# Patient Record
Sex: MALE | Race: BLACK OR AFRICAN AMERICAN | NOT HISPANIC OR LATINO | Employment: OTHER | URBAN - METROPOLITAN AREA
[De-identification: names, ages, dates, MRNs, and addresses within clinical notes are randomized per-mention and may not be internally consistent; named-entity substitution may affect disease eponyms.]

---

## 2017-01-24 RX ORDER — CLOPIDOGREL BISULFATE 75 MG/1
TABLET ORAL
Qty: 90 TABLET | OUTPATIENT
Start: 2017-01-24

## 2017-01-24 RX ORDER — ATENOLOL 50 MG/1
TABLET ORAL
Qty: 90 TABLET | OUTPATIENT
Start: 2017-01-24

## 2017-01-24 RX ORDER — LOSARTAN POTASSIUM 100 MG/1
TABLET ORAL
Qty: 90 TABLET | OUTPATIENT
Start: 2017-01-24

## 2017-01-24 RX ORDER — AMLODIPINE BESYLATE 10 MG/1
TABLET ORAL
Qty: 90 TABLET | OUTPATIENT
Start: 2017-01-24

## 2017-02-08 RX ORDER — ROSUVASTATIN CALCIUM 40 MG/1
TABLET, COATED ORAL
Qty: 30 TABLET | Refills: 0 | Status: SHIPPED | OUTPATIENT
Start: 2017-02-08 | End: 2017-03-08 | Stop reason: SDUPTHER

## 2017-02-08 RX ORDER — ATENOLOL 50 MG/1
TABLET ORAL
Qty: 30 TABLET | Refills: 0 | Status: SHIPPED | OUTPATIENT
Start: 2017-02-08 | End: 2017-03-08 | Stop reason: SDUPTHER

## 2017-02-08 RX ORDER — AMLODIPINE BESYLATE 10 MG/1
TABLET ORAL
Qty: 30 TABLET | Refills: 0 | Status: SHIPPED | OUTPATIENT
Start: 2017-02-08 | End: 2017-03-15 | Stop reason: SDUPTHER

## 2017-02-08 RX ORDER — LOSARTAN POTASSIUM 100 MG/1
TABLET ORAL
Qty: 30 TABLET | Refills: 0 | Status: SHIPPED | OUTPATIENT
Start: 2017-02-08 | End: 2017-03-08 | Stop reason: SDUPTHER

## 2017-02-08 RX ORDER — CLOPIDOGREL BISULFATE 75 MG/1
TABLET ORAL
Qty: 30 TABLET | Refills: 0 | Status: SHIPPED | OUTPATIENT
Start: 2017-02-08 | End: 2017-03-08 | Stop reason: SDUPTHER

## 2017-02-08 RX ORDER — FUROSEMIDE 40 MG/1
40 TABLET ORAL DAILY
Qty: 30 TABLET | Refills: 0 | Status: SHIPPED | OUTPATIENT
Start: 2017-02-08 | End: 2017-03-08 | Stop reason: SDUPTHER

## 2017-02-14 DIAGNOSIS — M54.50 LUMBAR SPINE PAIN: Primary | ICD-10-CM

## 2017-02-17 ENCOUNTER — TELEPHONE (OUTPATIENT)
Dept: ORTHOPEDICS | Facility: CLINIC | Age: 72
End: 2017-02-17

## 2017-02-17 ENCOUNTER — TELEPHONE (OUTPATIENT)
Dept: CARDIOLOGY | Facility: CLINIC | Age: 72
End: 2017-02-17

## 2017-02-17 NOTE — TELEPHONE ENCOUNTER
----- Message from Analy Avendaño sent at 2/17/2017 10:49 AM CST -----  Contact: pt called  Pt called, states he normally has blood work drawn and is requesting it done before visit.Ph [phone]. Pt will see Dr. Zamora on 2/20/17. Former pt of Dr. Jones and LOV 11/24/15. Thank you

## 2017-02-17 NOTE — TELEPHONE ENCOUNTER
Spoke to pt regarding appt Monday 2/20/17 at 130p with Jennifer Isaac PA-C. Pt was informed to arrive on the 2nd floor at 1245p, then up to floor 5 to see Jennifer. Pt verbalized understanding.    Isabel SAUNDERS

## 2017-02-20 ENCOUNTER — OFFICE VISIT (OUTPATIENT)
Dept: CARDIOLOGY | Facility: CLINIC | Age: 72
End: 2017-02-20
Payer: MEDICARE

## 2017-02-20 ENCOUNTER — OFFICE VISIT (OUTPATIENT)
Dept: ORTHOPEDICS | Facility: CLINIC | Age: 72
End: 2017-02-20
Payer: MEDICARE

## 2017-02-20 ENCOUNTER — HOSPITAL ENCOUNTER (OUTPATIENT)
Dept: RADIOLOGY | Facility: HOSPITAL | Age: 72
Discharge: HOME | End: 2017-02-20
Attending: ORTHOPAEDIC SURGERY
Payer: MEDICARE

## 2017-02-20 VITALS
HEIGHT: 71 IN | HEART RATE: 51 BPM | BODY MASS INDEX: 25.99 KG/M2 | WEIGHT: 185.63 LBS | DIASTOLIC BLOOD PRESSURE: 88 MMHG | SYSTOLIC BLOOD PRESSURE: 170 MMHG

## 2017-02-20 VITALS
WEIGHT: 196.88 LBS | BODY MASS INDEX: 28.18 KG/M2 | DIASTOLIC BLOOD PRESSURE: 91 MMHG | HEIGHT: 70 IN | HEART RATE: 48 BPM | RESPIRATION RATE: 16 BRPM | SYSTOLIC BLOOD PRESSURE: 194 MMHG

## 2017-02-20 DIAGNOSIS — M54.50 LUMBAR SPINE PAIN: ICD-10-CM

## 2017-02-20 DIAGNOSIS — I10 ESSENTIAL HYPERTENSION: Primary | ICD-10-CM

## 2017-02-20 DIAGNOSIS — Z95.5 STATUS POST CORONARY ARTERY STENT PLACEMENT: ICD-10-CM

## 2017-02-20 DIAGNOSIS — M43.10 SPONDYLOLISTHESIS, UNSPECIFIED SPINAL REGION: Primary | ICD-10-CM

## 2017-02-20 DIAGNOSIS — E78.5 HYPERLIPIDEMIA, UNSPECIFIED HYPERLIPIDEMIA TYPE: ICD-10-CM

## 2017-02-20 DIAGNOSIS — I25.10 CORONARY ARTERY DISEASE INVOLVING NATIVE CORONARY ARTERY OF NATIVE HEART WITHOUT ANGINA PECTORIS: ICD-10-CM

## 2017-02-20 PROCEDURE — 99214 OFFICE O/P EST MOD 30 MIN: CPT | Mod: S$PBB,,, | Performed by: PHYSICIAN ASSISTANT

## 2017-02-20 PROCEDURE — 99999 PR PBB SHADOW E&M-EST. PATIENT-LVL III: CPT | Mod: PBBFAC,,, | Performed by: INTERNAL MEDICINE

## 2017-02-20 PROCEDURE — 72120 X-RAY BEND ONLY L-S SPINE: CPT | Mod: 26,,, | Performed by: RADIOLOGY

## 2017-02-20 PROCEDURE — 99999 PR PBB SHADOW E&M-EST. PATIENT-LVL IV: CPT | Mod: PBBFAC,,, | Performed by: PHYSICIAN ASSISTANT

## 2017-02-20 PROCEDURE — 99213 OFFICE O/P EST LOW 20 MIN: CPT | Mod: PBBFAC,27 | Performed by: INTERNAL MEDICINE

## 2017-02-20 PROCEDURE — 99213 OFFICE O/P EST LOW 20 MIN: CPT | Mod: S$PBB,,, | Performed by: INTERNAL MEDICINE

## 2017-02-20 PROCEDURE — 72100 X-RAY EXAM L-S SPINE 2/3 VWS: CPT | Mod: 26,,, | Performed by: RADIOLOGY

## 2017-02-20 RX ORDER — ASPIRIN 325 MG
325 TABLET ORAL DAILY
COMMUNITY

## 2017-02-20 RX ORDER — DICLOFENAC SODIUM 75 MG/1
75 TABLET, DELAYED RELEASE ORAL 2 TIMES DAILY
Qty: 60 TABLET | Refills: 1 | Status: SHIPPED | OUTPATIENT
Start: 2017-02-20 | End: 2017-03-22

## 2017-02-20 NOTE — MR AVS SNAPSHOT
[facility]  [address]  [facility] [address]  Phone: [phone]                  Efra Huitron   2017 4:30 PM   Office Visit    Description:  Male : 1945   Provider:  Harsh Zamora MD   Department:  [facility]           Reason for Visit     Hypertension           Diagnoses this Visit        Comments    Essential hypertension    -  Primary     Status post coronary artery stent placement         Coronary artery disease involving native coronary artery of native heart without angina pectoris         Hyperlipidemia, unspecified hyperlipidemia type                To Do List           Goals (5 Years of Data)     None      Follow-Up and Disposition     Return in about 1 year (around 2018).      [facility]     [facility] -  Assistance  Registered nurses in the [facility] provide clinical advisement, health education, appointment booking, and other advisory services.  Call for this free service at [phone].             Medications           Message regarding Medications     Verify the changes and/or additions to your medication regime listed below are the same as discussed with your clinician today.  If any of these changes or additions are incorrect, please notify your healthcare provider.             Verify that the below list of medications is an accurate representation of the medications you are currently taking.  If none reported, the list may be blank. If incorrect, please contact your healthcare provider. Carry this list with you in case of emergency.           Current Medications     amlodipine (NORVASC) 10 MG tablet Take 1 tablet by mouth  daily    aspirin 325 MG tablet Take 500 mg by mouth once daily.    atenolol (TENORMIN) 50 MG tablet Take 1 tablet by mouth  daily    clopidogrel (PLAVIX) 75 mg tablet Take 1 tablet by mouth  daily    coenzyme Q10 400 mg Cap Take by mouth. 1 Capsule Oral Every day     "FLAXSEED OIL MISC Take by mouth. 3 Capsule Oral Every day    furosemide (LASIX) 40 MG tablet Take 1 tablet (40 mg total) by mouth once daily. 1 Tablet Oral Every day    losartan (COZAAR) 100 MG tablet Take 1 tablet by mouth  daily    omega-3 fatty acids 1,250 mg Cap Take by mouth. 3 Capsule Oral Every day    rosuvastatin (CRESTOR) 40 MG Tab Take 1 tablet by mouth  daily    diclofenac (VOLTAREN) 75 MG EC tablet Take 1 tablet (75 mg total) by mouth 2 (two) times daily.           Clinical Reference Information           Your Vitals Were     BP Pulse Height Weight BMI    170/88 (BP Location: Left arm, Patient Position: Sitting, BP Method: Automatic) 51 5' 11" (1.803 m) 84.2 kg (185 lb 10 oz) 25.89 kg/m2      Blood Pressure          Most Recent Value    Right Arm BP - Sitting  174/81    Left Arm BP - Sitting  170/88    BP  (!)  170/88      Allergies as of 2/20/2017     No Known Allergies      Immunizations Administered on Date of Encounter - 2/20/2017     None      [facility] Sign-Up     Activating your [facility] account is as easy as 1-2-3!     1) Visit [email or web address], select Sign Up Now, enter this activation code and your date of birth, then select Next.  [email or web address]  Expires: 4/6/2017  3:36 PM      2) Create a username and password to use when you visit [facility] in the future and select a security question in case you lose your password and select Next.    3) Enter your e-mail address and click Sign Up!    Additional Information  If you have questions, please e-mail [email or web address] or call [phone] to talk to our [facility] staff. Remember, [facility] is NOT to be used for urgent needs. For medical emergencies, dial 911.         Language Assistance Services     ATTENTION: Language assistance services are available, free of charge. Please call [phone].      ATENCIÓN: Si habla español, tiene a herzog disposición servicios gratuitos de asistencia lingüística. Llame al [phone].     CHÚ Ý: N?u b?n " nói Ti?ng Vi?t, có các d?ch v? h? tr? ngôn ng? mi?n phí dành cho b?n. G?i s? [phone].         [facility] complies with applicable Federal civil rights laws and does not discriminate on the basis of race, color, national origin, age, disability, or sex.

## 2017-02-20 NOTE — PROGRESS NOTES
Chart has been dictated using voice recognition software.  It is not been reviewed carefully for any transcriptional errors due to this technology.   Subjective:   Patient ID:  Efra Huitron is a 71 y.o. male who presents for evaluation of Hypertension (Annual Exam)      HPI: Patient from [place] with hypertension, hyperlipidemia, and coronary artery disease with PCI (with stent) in 2001. Patient denies any chest discomfort on exertion or at rest.  Patient denies any dyspnea at rest or on exertion, orthopnea, PND, or edema. However, patient wears compression stockings.  Patient denies any palpitations, lightheadedness, or syncope. Patient denies lower extremity claudication.       Cardiac risk factors: hyperlipidemia, hypertension, sedentary lifestyle, a history of coronary artery disease       Past Medical History   Diagnosis Date    Hypertension     S/P PTCA (percutaneous transluminal coronary angioplasty) 2001       Past Surgical History   Procedure Laterality Date    Coronary angioplasty with stent placement  2001     LAD and CFX       Social History   Substance Use Topics    Smoking status: Never Smoker    Smokeless tobacco: None    Alcohol use No         Current Outpatient Prescriptions:     amlodipine (NORVASC) 10 MG tablet, Take 1 tablet by mouth  daily, Disp: 30 tablet, Rfl: 0    aspirin 325 MG tablet, Take 500 mg by mouth once daily., Disp: , Rfl:     atenolol (TENORMIN) 50 MG tablet, Take 1 tablet by mouth  daily, Disp: 30 tablet, Rfl: 0    clopidogrel (PLAVIX) 75 mg tablet, Take 1 tablet by mouth  daily, Disp: 30 tablet, Rfl: 0    coenzyme Q10 400 mg Cap, Take by mouth. 1 Capsule Oral Every day, Disp: , Rfl:     FLAXSEED OIL MISC, Take by mouth. 3 Capsule Oral Every day, Disp: , Rfl:     furosemide (LASIX) 40 MG tablet, Take 1 tablet (40 mg total) by mouth once daily. 1 Tablet Oral Every day, Disp: 30 tablet, Rfl: 0    losartan (COZAAR) 100 MG tablet, Take 1 tablet by mouth  daily,  "Disp: 30 tablet, Rfl: 0    omega-3 fatty acids 1,250 mg Cap, Take by mouth. 3 Capsule Oral Every day, Disp: , Rfl:     rosuvastatin (CRESTOR) 40 MG Tab, Take 1 tablet by mouth  daily, Disp: 30 tablet, Rfl: 0    diclofenac (VOLTAREN) 75 MG EC tablet, Take 1 tablet (75 mg total) by mouth 2 (two) times daily., Disp: 60 tablet, Rfl: 1    Review of patient's allergies indicates:  No Known Allergies    ROS - The patient's review of systems is negative for any significant constitutional, respiratory, endocrine, hematologic, musculoskeletal or neurologic symptoms.   Objective:   Physical Exam   Constitutional: He is oriented to person, place, and time. He appears well-developed and well-nourished.   BP (!) 170/88 (BP Location: Left arm, Patient Position: Sitting, BP Method: Automatic)  Pulse (!) 51  Ht 5' 11" (1.803 m)  Wt 84.2 kg (185 lb 10 oz)  BMI 25.89 kg/m2  Repeat BP  150/90, left arm sitting   Neck: Neck supple. No JVD present. Carotid bruit is not present.   Cardiovascular: Normal rate, regular rhythm, normal heart sounds and intact distal pulses.  Exam reveals no gallop and no friction rub.    No murmur heard.  Pulmonary/Chest: Effort normal and breath sounds normal. He has no wheezes. He has no rales.   Abdominal: Soft. Bowel sounds are normal. He exhibits no abdominal bruit. There is no hepatomegaly. There is no tenderness.   Musculoskeletal: He exhibits no edema.   Neurological: He is alert and oriented to person, place, and time. He has normal strength.   Skin: No cyanosis. Nails show no clubbing.       No results found for: WBC, HGB, HCT, MCV, PLT    Lab Results   Component Value Date     11/24/2015    K 4.5 11/24/2015    BUN 17 11/24/2015    CREATININE 1.7 (H) 11/24/2015    GLU 88 11/24/2015    CHOL 161 11/24/2015    HDL 49 11/24/2015    LDLCALC 94.4 11/24/2015    TRIG 88 11/24/2015    CHOLHDL 30.4 11/24/2015       Assessment:     1. Essential hypertension    2. Status post coronary artery stent " placement    3. Coronary artery disease involving native coronary artery of native heart without angina pectoris    4. Hyperlipidemia, unspecified hyperlipidemia type      Patient appears to be doing very well without evidence of ischemia, heart failure, or significant arrhythmias.  He was discussed with the patient for him to seek medical care closer to where he lives in [place].  The patient's blood pressure most likely needs further control, however, he is leaving for [place] in 2 days.  Therefore, no change in his medical therapy will be made at this time.  The patient was instructed on use of [facility] and he will obtain home blood pressures and send them and via [facility].  Depending on the result, changes in her medication will be made at that time the patient will be followed locally.  Additionally, the patient agreed to enter the ADAPTABLE study which is testing 81 mg versus 325 mg of aspirin in patients with coronary artery disease.     Plan:     Efra was seen today for hypertension.    Diagnoses and all orders for this visit:    Essential hypertension    Status post coronary artery stent placement    Coronary artery disease involving native coronary artery of native heart without angina pectoris    Hyperlipidemia, unspecified hyperlipidemia type    Other orders  -     aspirin 325 MG tablet; Take 500 mg by mouth once daily.

## 2017-02-20 NOTE — PROGRESS NOTES
DATE: 2/20/2017  PATIENT: Efra Huitron    Supervising Physician: Dieudonne Caba M.D.    CHIEF COMPLAINT: back pain    HISTORY:  Efra Huitron is a 71 y.o. male here for initial evaluation of low back pain (Back - 4, Leg - 0). The pain has been present for many years. The patient describes the pain as aching.  The pain is worse with lifting and improved by activity. There is no associated numbness and tingling. There is no subjective weakness. Prior treatments have included mobic, home exercises and shoe inserts, but no physical therapy, ESIs or surgery.    The patient denies myelopathic symptoms such as handwriting changes or difficulty with buttons/coins/keys. Denies perineal paresthesias, bowel/bladder dysfunction.    PAST MEDICAL/SURGICAL HISTORY:  Past Medical History   Diagnosis Date    Hypertension     S/P PTCA (percutaneous transluminal coronary angioplasty) 2001     Past Surgical History   Procedure Laterality Date    Coronary angioplasty with stent placement  2001     LAD and CFX       Medications:   Current Outpatient Prescriptions on File Prior to Visit   Medication Sig Dispense Refill    amlodipine (NORVASC) 10 MG tablet Take 1 tablet by mouth  daily 30 tablet 0    atenolol (TENORMIN) 50 MG tablet Take 1 tablet by mouth  daily 30 tablet 0    clopidogrel (PLAVIX) 75 mg tablet Take 1 tablet by mouth  daily 30 tablet 0    coenzyme Q10 400 mg Cap Take by mouth. 1 Capsule Oral Every day      FLAXSEED OIL MISC Take by mouth. 3 Capsule Oral Every day      furosemide (LASIX) 40 MG tablet Take 1 tablet (40 mg total) by mouth once daily. 1 Tablet Oral Every day 30 tablet 0    losartan (COZAAR) 100 MG tablet Take 1 tablet by mouth  daily 30 tablet 0    omega-3 fatty acids 1,250 mg Cap Take by mouth. 3 Capsule Oral Every day      rosuvastatin (CRESTOR) 40 MG Tab Take 1 tablet by mouth  daily 30 tablet 0    [DISCONTINUED] meloxicam (MOBIC) 15 MG tablet Take 1 tablet (15 mg total) by mouth  "once daily. Take with food. 90 tablet 2     No current facility-administered medications on file prior to visit.        Social History:   Social History     Social History    Marital status: Single     Spouse name: N/A    Number of children: N/A    Years of education: N/A     Occupational History    Not on file.     Social History Main Topics    Smoking status: Never Smoker    Smokeless tobacco: Not on file    Alcohol use No    Drug use: Not on file    Sexual activity: Not on file     Other Topics Concern    Not on file     Social History Narrative       REVIEW OF SYSTEMS:  Constitution: Negative. Negative for chills, fever and night sweats.   Cardiovascular: Negative for chest pain and syncope.   Respiratory: Negative for cough and shortness of breath.   Gastrointestinal: See HPI. Negative for nausea/vomiting. Negative for abdominal pain.  Genitourinary: See HPI. Negative for discoloration or dysuria.  Skin: Negative for dry skin, itching and rash.   Hematologic/Lymphatic: Negative for bleeding problem. Does not bruise/bleed easily.   Musculoskeletal: Negative for falls and muscle weakness.   Neurological: See HPI. No seizures.   Endocrine: Negative for polydipsia, polyphagia and polyuria.   Allergic/Immunologic: Negative for hives and persistent infections.     EXAM:  Visit Vitals    BP (!) 194/91    Pulse (!) 48    Resp 16    Ht 5' 10" (1.778 m)    Wt 89.3 kg (196 lb 13.9 oz)    BMI 28.25 kg/m2       General: The patient is a very pleasant 71 y.o. male in no apparent distress, the patient is oriented to person, place and time.  Psych: Normal mood and affect  HEENT: Vision grossly intact, hearing intact to the spoken word.  Lungs: Respirations unlabored.  Gait: Normal station and gait, no difficulty with toe or heel walk.   Skin: Dorsal lumbar skin negative for rashes, lesions, hairy patches and surgical scars. There is mild lumbar tenderness to palpation.  Range of motion: Lumbar range of motion " is acceptable.  Spinal Balance: Global saggital and coronal spinal balance acceptable, not significant for scoliosis and kyphosis.  Musculoskeletal: No pain with the range of motion of the bilateral hips. No trochanteric tenderness to palpation.  Vascular: Bilateral lower extremities warm and well perfused, dorsalis pedis pulses 2+ bilaterally.  Neurological: Normal strength and tone in all major motor groups in the bilateral lower extremities. Normal sensation to light touch in the L2-S1 dermatomes bilaterally.  Deep tendon reflexes symmetric 1+ in the bilateral lower extremities.  Negative Babinski bilaterally. Straight leg raise negative bilaterally.    IMAGING:      Today I personally reviewed AP, Lat and Flex/Ex  upright L-spine films that demonstrate grade I anterolisthesis of L4/5 with probably spondylolysis at L4.      ASSESSMENT/PLAN:    Efra was seen today for pain.    Diagnoses and all orders for this visit:    Spondylolisthesis, unspecified spinal region  -     Ambulatory Referral to Physical/Occupational Therapy    Other orders  -     diclofenac (VOLTAREN) 75 MG EC tablet; Take 1 tablet (75 mg total) by mouth 2 (two) times daily.    The patient is having back pain without radicular symptoms.  He had radicular symptoms in the past which have resolved.  He says his symptoms were worse after lifting a heavy piece of glass up the stairs and have significantly improved since he bought show inserts.  Referral for PT placed today.  He lives in [place] and will go to PT there.  Follow up after therapy if symptoms persist.       Return in about 6 weeks (around 4/3/2017), or if symptoms worsen or fail to improve.

## 2017-03-08 RX ORDER — LOSARTAN POTASSIUM 100 MG/1
TABLET ORAL
Qty: 90 TABLET | Refills: 3 | Status: SHIPPED | OUTPATIENT
Start: 2017-03-08 | End: 2018-02-17 | Stop reason: SDUPTHER

## 2017-03-08 RX ORDER — FUROSEMIDE 40 MG/1
40 TABLET ORAL DAILY
Qty: 90 TABLET | Refills: 3 | Status: SHIPPED | OUTPATIENT
Start: 2017-03-08 | End: 2018-02-17 | Stop reason: SDUPTHER

## 2017-03-08 RX ORDER — ATENOLOL 50 MG/1
TABLET ORAL
Qty: 90 TABLET | Refills: 3 | Status: SHIPPED | OUTPATIENT
Start: 2017-03-08 | End: 2018-02-17 | Stop reason: SDUPTHER

## 2017-03-08 RX ORDER — DICLOFENAC SODIUM 75 MG/1
TABLET, DELAYED RELEASE ORAL
Qty: 120 TABLET | OUTPATIENT
Start: 2017-03-08

## 2017-03-08 RX ORDER — CLOPIDOGREL BISULFATE 75 MG/1
TABLET ORAL
Qty: 90 TABLET | Refills: 3 | Status: SHIPPED | OUTPATIENT
Start: 2017-03-08 | End: 2018-02-17 | Stop reason: SDUPTHER

## 2017-03-08 RX ORDER — ROSUVASTATIN CALCIUM 40 MG/1
TABLET, COATED ORAL
Qty: 90 TABLET | Refills: 3 | Status: SHIPPED | OUTPATIENT
Start: 2017-03-08 | End: 2018-02-17 | Stop reason: SDUPTHER

## 2017-03-08 NOTE — TELEPHONE ENCOUNTER
----- Message from Ale Dolan sent at 3/8/2017 12:27 PM CST -----  Contact: pt  Pt says after his visit with dr. Zamora his refills were not called in to the mail order service.   LOV 2/20/17 Dr. Zamora 90 day supply needed  Atenolol 50 mg  Crestor 40 mg  Losartan 100 mg  Lasix 40 mg  Plavix 75 mg    Thanks

## 2017-03-15 RX ORDER — AMLODIPINE BESYLATE 10 MG/1
TABLET ORAL
Qty: 90 TABLET | Refills: 3 | Status: SHIPPED | OUTPATIENT
Start: 2017-03-15 | End: 2018-02-17 | Stop reason: SDUPTHER

## 2017-03-15 NOTE — TELEPHONE ENCOUNTER
----- Message from Ale Dolan sent at 3/15/2017 11:45 AM CDT -----  Contact: pt   Pt doing a follow up on a prior authorization on his Norvasc 10 mg he uses [employer] mail service.  LOV 2/20/17 Dr. Zamora    Thanks

## 2017-10-27 NOTE — TELEPHONE ENCOUNTER
----- Message from Ale Dolan sent at 2/8/2017  2:58 PM CST -----  Contact: pt  Pt called needing a refill on all his meds  LOV 11/24/15 Dr. Jones scheduled to see Dr. Zamora on 2/20/17    Thanks   
no anemia, no easy bruising, no jaundice, no swollen lymph nodes.

## 2018-02-17 RX ORDER — LOSARTAN POTASSIUM 100 MG/1
TABLET ORAL
Qty: 90 TABLET | Refills: 3 | Status: SHIPPED | OUTPATIENT
Start: 2018-02-17 | End: 2018-08-20

## 2018-02-17 RX ORDER — AMLODIPINE BESYLATE 10 MG/1
TABLET ORAL
Qty: 90 TABLET | Refills: 3 | Status: SHIPPED | OUTPATIENT
Start: 2018-02-17 | End: 2019-01-08 | Stop reason: SDUPTHER

## 2018-02-17 RX ORDER — ATENOLOL 50 MG/1
TABLET ORAL
Qty: 90 TABLET | Refills: 3 | Status: SHIPPED | OUTPATIENT
Start: 2018-02-17 | End: 2019-01-08 | Stop reason: SDUPTHER

## 2018-02-17 RX ORDER — FUROSEMIDE 40 MG/1
TABLET ORAL
Qty: 90 TABLET | Refills: 3 | Status: SHIPPED | OUTPATIENT
Start: 2018-02-17 | End: 2019-01-08 | Stop reason: SDUPTHER

## 2018-02-17 RX ORDER — CLOPIDOGREL BISULFATE 75 MG/1
TABLET ORAL
Qty: 90 TABLET | Refills: 3 | Status: SHIPPED | OUTPATIENT
Start: 2018-02-17 | End: 2019-01-08 | Stop reason: SDUPTHER

## 2018-02-17 RX ORDER — ROSUVASTATIN CALCIUM 40 MG/1
TABLET, COATED ORAL
Qty: 90 TABLET | Refills: 3 | Status: SHIPPED | OUTPATIENT
Start: 2018-02-17 | End: 2019-01-08 | Stop reason: SDUPTHER

## 2018-03-08 ENCOUNTER — APPOINTMENT (RX ONLY)
Dept: URBAN - METROPOLITAN AREA CLINIC 45 | Facility: CLINIC | Age: 73
Setting detail: DERMATOLOGY
End: 2018-03-08

## 2018-03-08 ENCOUNTER — APPOINTMENT (RX ONLY)
Dept: URBAN - METROPOLITAN AREA CLINIC 44 | Facility: CLINIC | Age: 73
Setting detail: DERMATOLOGY
End: 2018-03-08

## 2018-03-08 DIAGNOSIS — L81.5 LEUKODERMA, NOT ELSEWHERE CLASSIFIED: ICD-10-CM

## 2018-03-08 PROCEDURE — 99202 OFFICE O/P NEW SF 15 MIN: CPT

## 2018-03-08 PROCEDURE — ? COUNSELING

## 2018-03-08 PROCEDURE — ? TREATMENT REGIMEN

## 2018-03-08 ASSESSMENT — LOCATION ZONE DERM
LOCATION ZONE: TRUNK
LOCATION ZONE: ARM
LOCATION ZONE: FACE
LOCATION ZONE: FACE
LOCATION ZONE: NECK
LOCATION ZONE: ARM
LOCATION ZONE: TRUNK
LOCATION ZONE: NECK

## 2018-03-08 ASSESSMENT — LOCATION DETAILED DESCRIPTION DERM
LOCATION DETAILED: LEFT LATERAL MALAR CHEEK
LOCATION DETAILED: RIGHT SUPERIOR LATERAL UPPER BACK
LOCATION DETAILED: LEFT LATERAL MALAR CHEEK
LOCATION DETAILED: LEFT SUPERIOR UPPER BACK
LOCATION DETAILED: RIGHT SUPERIOR LATERAL UPPER BACK
LOCATION DETAILED: LEFT SUPERIOR MEDIAL UPPER BACK
LOCATION DETAILED: RIGHT PROXIMAL DORSAL FOREARM
LOCATION DETAILED: LEFT INFERIOR ANTERIOR NECK
LOCATION DETAILED: LEFT INFERIOR ANTERIOR NECK
LOCATION DETAILED: RIGHT CENTRAL MALAR CHEEK
LOCATION DETAILED: LEFT DISTAL DORSAL FOREARM
LOCATION DETAILED: LEFT DISTAL DORSAL FOREARM
LOCATION DETAILED: RIGHT PROXIMAL DORSAL FOREARM
LOCATION DETAILED: RIGHT CENTRAL MALAR CHEEK
LOCATION DETAILED: LEFT SUPERIOR UPPER BACK
LOCATION DETAILED: LEFT SUPERIOR MEDIAL UPPER BACK

## 2018-03-08 ASSESSMENT — LOCATION SIMPLE DESCRIPTION DERM
LOCATION SIMPLE: RIGHT UPPER BACK
LOCATION SIMPLE: LEFT ANTERIOR NECK
LOCATION SIMPLE: LEFT CHEEK
LOCATION SIMPLE: RIGHT FOREARM
LOCATION SIMPLE: RIGHT CHEEK
LOCATION SIMPLE: RIGHT CHEEK
LOCATION SIMPLE: RIGHT UPPER BACK
LOCATION SIMPLE: LEFT FOREARM
LOCATION SIMPLE: RIGHT FOREARM
LOCATION SIMPLE: LEFT ANTERIOR NECK
LOCATION SIMPLE: LEFT FOREARM
LOCATION SIMPLE: LEFT UPPER BACK
LOCATION SIMPLE: LEFT UPPER BACK
LOCATION SIMPLE: LEFT CHEEK

## 2018-03-08 NOTE — PROCEDURE: MIPS QUALITY
Quality 47: Advance Care Plan: Advance Care Planning discussed and documented in the medical record; patient did not wish or was not able to name a surrogate decision maker or provide an advance care plan.
Detail Level: Detailed
Quality 431: Preventive Care And Screening: Unhealthy Alcohol Use - Screening: Patient screened for unhealthy alcohol use using a single question and scores less than 2 times per year
Quality 130: Documentation Of Current Medications In The Medical Record: Current Medications Documented
Quality 111:Pneumonia Vaccination Status For Older Adults: Pneumococcal Vaccination not Administered or Previously Received, Reason not Otherwise Specified
Quality 110: Preventive Care And Screening: Influenza Immunization: Influenza immunization was not ordered or administered, reason not given
Quality 226: Preventive Care And Screening: Tobacco Use: Screening And Cessation Intervention: Patient screened for tobacco and never smoked

## 2018-03-08 NOTE — PROCEDURE: MIPS QUALITY
Quality 110: Preventive Care And Screening: Influenza Immunization: Influenza immunization was not ordered or administered, reason not given
Quality 47: Advance Care Plan: Advance Care Planning discussed and documented in the medical record; patient did not wish or was not able to name a surrogate decision maker or provide an advance care plan.
Quality 111:Pneumonia Vaccination Status For Older Adults: Pneumococcal Vaccination not Administered or Previously Received, Reason not Otherwise Specified
Quality 431: Preventive Care And Screening: Unhealthy Alcohol Use - Screening: Patient screened for unhealthy alcohol use using a single question and scores less than 2 times per year
Quality 130: Documentation Of Current Medications In The Medical Record: Current Medications Documented
Detail Level: Detailed
Quality 226: Preventive Care And Screening: Tobacco Use: Screening And Cessation Intervention: Patient screened for tobacco and never smoked

## 2018-04-09 ENCOUNTER — LAB VISIT (OUTPATIENT)
Dept: LAB | Facility: HOSPITAL | Age: 73
End: 2018-04-09
Attending: INTERNAL MEDICINE
Payer: MEDICARE

## 2018-04-09 ENCOUNTER — OFFICE VISIT (OUTPATIENT)
Dept: CARDIOLOGY | Facility: CLINIC | Age: 73
End: 2018-04-09
Payer: MEDICARE

## 2018-04-09 VITALS
HEIGHT: 70 IN | WEIGHT: 199.06 LBS | HEART RATE: 50 BPM | BODY MASS INDEX: 28.5 KG/M2 | DIASTOLIC BLOOD PRESSURE: 100 MMHG | SYSTOLIC BLOOD PRESSURE: 160 MMHG

## 2018-04-09 DIAGNOSIS — Z95.5 STATUS POST CORONARY ARTERY STENT PLACEMENT: Primary | ICD-10-CM

## 2018-04-09 DIAGNOSIS — I10 ESSENTIAL HYPERTENSION: ICD-10-CM

## 2018-04-09 DIAGNOSIS — E78.5 HYPERLIPIDEMIA, UNSPECIFIED HYPERLIPIDEMIA TYPE: ICD-10-CM

## 2018-04-09 DIAGNOSIS — Z95.5 STATUS POST CORONARY ARTERY STENT PLACEMENT: ICD-10-CM

## 2018-04-09 LAB
ALBUMIN SERPL BCP-MCNC: 3.7 G/DL
ALP SERPL-CCNC: 154 U/L
ALT SERPL W/O P-5'-P-CCNC: 22 U/L
ANION GAP SERPL CALC-SCNC: 4 MMOL/L
AST SERPL-CCNC: 27 U/L
BILIRUB SERPL-MCNC: 0.9 MG/DL
BUN SERPL-MCNC: 17 MG/DL
CALCIUM SERPL-MCNC: 9.5 MG/DL
CHLORIDE SERPL-SCNC: 104 MMOL/L
CHOLEST SERPL-MCNC: 166 MG/DL
CHOLEST/HDLC SERPL: 3.5 {RATIO}
CO2 SERPL-SCNC: 32 MMOL/L
CREAT SERPL-MCNC: 1.7 MG/DL
EST. GFR  (AFRICAN AMERICAN): 45.6 ML/MIN/1.73 M^2
EST. GFR  (NON AFRICAN AMERICAN): 39.4 ML/MIN/1.73 M^2
GLUCOSE SERPL-MCNC: 97 MG/DL
HDLC SERPL-MCNC: 48 MG/DL
HDLC SERPL: 28.9 %
LDLC SERPL CALC-MCNC: 93.6 MG/DL
NONHDLC SERPL-MCNC: 118 MG/DL
POTASSIUM SERPL-SCNC: 5.1 MMOL/L
PROT SERPL-MCNC: 7 G/DL
SODIUM SERPL-SCNC: 140 MMOL/L
TRIGL SERPL-MCNC: 122 MG/DL

## 2018-04-09 PROCEDURE — 36415 COLL VENOUS BLD VENIPUNCTURE: CPT

## 2018-04-09 PROCEDURE — 99213 OFFICE O/P EST LOW 20 MIN: CPT | Mod: S$PBB,,, | Performed by: INTERNAL MEDICINE

## 2018-04-09 PROCEDURE — 99214 OFFICE O/P EST MOD 30 MIN: CPT | Mod: PBBFAC | Performed by: INTERNAL MEDICINE

## 2018-04-09 PROCEDURE — 99999 PR PBB SHADOW E&M-EST. PATIENT-LVL IV: CPT | Mod: PBBFAC,,, | Performed by: INTERNAL MEDICINE

## 2018-04-09 PROCEDURE — 80061 LIPID PANEL: CPT

## 2018-04-09 PROCEDURE — 80053 COMPREHEN METABOLIC PANEL: CPT

## 2018-04-09 NOTE — PROGRESS NOTES
Chart has been dictated using voice recognition software.  It is not been reviewed carefully for any transcriptional errors due to this technology.   Subjective:   Patient ID:  Efra Huitron is a 72 y.o. male who presents for follow-up of Coronary Artery Disease; Hypertension; and Hyperlipidemia      HPI: Patient from [place] with hypertension, hyperlipidemia, and coronary artery disease status post PTCA in 2001 status post PTCA presents for reevaluation.  Patient denies any chest discomfort on exertion or at rest.  Patient denies any dyspnea at rest or on exertion, orthopnea, or PND. Has some edema treated with compression stockings.  Able to cut just under an acre of grass without stopping. Patient denies any palpitations, lightheadedness, or syncope.     Past Medical History:   Diagnosis Date    Hypertension     S/P PTCA (percutaneous transluminal coronary angioplasty) 2001       Outpatient Medications Prior to Visit   Medication Sig Dispense Refill    amLODIPine (NORVASC) 10 MG tablet TAKE 1 TABLET BY MOUTH  DAILY 90 tablet 3    aspirin 325 MG tablet Take 500 mg by mouth once daily.      atenolol (TENORMIN) 50 MG tablet TAKE 1 TABLET BY MOUTH  DAILY 90 tablet 3    clopidogrel (PLAVIX) 75 mg tablet TAKE 1 TABLET BY MOUTH  DAILY 90 tablet 3    coenzyme Q10 400 mg Cap Take by mouth. 1 Capsule Oral Every day      FLAXSEED OIL MISC Take by mouth. 3 Capsule Oral Every day      furosemide (LASIX) 40 MG tablet TAKE 1 TABLET BY MOUTH ONCE A DAY 90 tablet 3    losartan (COZAAR) 100 MG tablet TAKE 1 TABLET BY MOUTH  DAILY 90 tablet 3    omega-3 fatty acids 1,250 mg Cap Take by mouth. 3 Capsule Oral Every day      rosuvastatin (CRESTOR) 40 MG Tab TAKE 1 TABLET BY MOUTH  DAILY 90 tablet 3     No facility-administered medications prior to visit.        Review of Systems   Constitution: Negative for weight gain and weight loss.   HENT: Negative for nosebleeds.    Eyes: Negative for vision loss in left eye and  "vision loss in right eye.   Cardiovascular: Negative for claudication.        As above   Respiratory: Negative for hemoptysis, shortness of breath, snoring, sputum production and wheezing.    Endocrine: Negative for polydipsia and polyuria.   Hematologic/Lymphatic: Does not bruise/bleed easily.   Musculoskeletal: Negative for myalgias.   Gastrointestinal: Negative for change in bowel habit, hematemesis, hematochezia, melena, nausea and vomiting.   Genitourinary: Negative for hematuria.   Neurological: Negative for focal weakness and numbness.     Objective:   Physical Exam   Constitutional: He is oriented to person, place, and time. He appears well-developed and well-nourished.   BP (!) 160/100   Pulse (!) 50   Ht 5' 10" (1.778 m)   Wt 90.3 kg (199 lb 1.2 oz)   BMI 28.56 kg/m²    Eyes: Pupils are equal, round, and reactive to light.   Neck: Neck supple. No JVD present. Carotid bruit is not present. No thyromegaly present.   Cardiovascular: Normal rate, regular rhythm and intact distal pulses.   No extrasystoles are present. PMI is not displaced.  Exam reveals gallop and S4. Exam reveals no friction rub.    No murmur heard.  Pulmonary/Chest: Effort normal and breath sounds normal. He has no wheezes. He has no rales.   Abdominal: Soft. Bowel sounds are normal. There is no hepatosplenomegaly. There is no tenderness.   Musculoskeletal: He exhibits edema (1+ bilateral ankle).   Neurological: He is alert and oriented to person, place, and time. He has normal strength.   Skin: No cyanosis. Nails show no clubbing.         Lab Results   Component Value Date     04/09/2018    K 5.1 04/09/2018    BUN 17 04/09/2018    CREATININE 1.7 (H) 04/09/2018    GLU 97 04/09/2018    CHOL 166 04/09/2018    HDL 48 04/09/2018    LDLCALC 93.6 04/09/2018    TRIG 122 04/09/2018    CHOLHDL 28.9 04/09/2018       Assessment:     1. Status post coronary artery stent placement    2. Essential hypertension    3. Hyperlipidemia, unspecified " hyperlipidemia type      Patient has no symptoms of cardiac ischemia, heart failure, or significant arrhythmias.  Patient's blood pressure is markedly elevated despite the use of multiple medications.  Therefore, he will referred to Dr. HANNAH Be for consideration for interventional hypertension trials.  If the patient is not a candidate for one of Dr. Be's trials, addition of an alpha blocker to his regimen will be made.  The patient should return in 6 weeks for reevaluation.  Plan:     Efra was seen today for coronary artery disease, hypertension and hyperlipidemia.    Diagnoses and all orders for this visit:    Status post coronary artery stent placement  -     Comprehensive metabolic panel; Future; Expected date: 04/09/2018    Essential hypertension  -     Comprehensive metabolic panel; Future; Expected date: 04/09/2018  -     Ambulatory Referral to Interventional Cardiology    Hyperlipidemia, unspecified hyperlipidemia type  -     Lipid panel; Future; Expected date: 04/09/2018          Harsh Zamora MD  Consultative Cardiology

## 2018-04-10 ENCOUNTER — TELEPHONE (OUTPATIENT)
Dept: CARDIOLOGY | Facility: CLINIC | Age: 73
End: 2018-04-10

## 2018-08-20 ENCOUNTER — OFFICE VISIT (OUTPATIENT)
Dept: CARDIOLOGY | Facility: CLINIC | Age: 73
End: 2018-08-20
Payer: MEDICARE

## 2018-08-20 VITALS
DIASTOLIC BLOOD PRESSURE: 86 MMHG | HEART RATE: 40 BPM | SYSTOLIC BLOOD PRESSURE: 180 MMHG | HEIGHT: 71 IN | OXYGEN SATURATION: 97 % | BODY MASS INDEX: 26.82 KG/M2 | WEIGHT: 191.56 LBS

## 2018-08-20 DIAGNOSIS — I10 ESSENTIAL HYPERTENSION: ICD-10-CM

## 2018-08-20 DIAGNOSIS — I25.10 CORONARY ARTERY DISEASE INVOLVING NATIVE CORONARY ARTERY OF NATIVE HEART WITHOUT ANGINA PECTORIS: ICD-10-CM

## 2018-08-20 DIAGNOSIS — Z95.5 STATUS POST CORONARY ARTERY STENT PLACEMENT: Primary | ICD-10-CM

## 2018-08-20 DIAGNOSIS — E78.5 HYPERLIPIDEMIA, UNSPECIFIED HYPERLIPIDEMIA TYPE: ICD-10-CM

## 2018-08-20 PROCEDURE — 99214 OFFICE O/P EST MOD 30 MIN: CPT | Mod: S$PBB,,, | Performed by: INTERNAL MEDICINE

## 2018-08-20 PROCEDURE — 99213 OFFICE O/P EST LOW 20 MIN: CPT | Mod: PBBFAC | Performed by: INTERNAL MEDICINE

## 2018-08-20 PROCEDURE — 99999 PR PBB SHADOW E&M-EST. PATIENT-LVL III: CPT | Mod: PBBFAC,,, | Performed by: INTERNAL MEDICINE

## 2018-08-20 RX ORDER — OLMESARTAN MEDOXOMIL 40 MG/1
40 TABLET ORAL DAILY
Qty: 90 TABLET | Refills: 3 | Status: SHIPPED | OUTPATIENT
Start: 2018-08-20 | End: 2019-01-08 | Stop reason: SDUPTHER

## 2018-08-20 NOTE — PROGRESS NOTES
Subjective:   Patient ID:  Efra Huitron is a 72 y.o. male who presents for follow-up of Status post coronary artery stent placement (4 month f/u ) and Medication Management (Losartan Doseage )      HPI: Patient from [place] with hypertension, hyperlipidemia, and coronary artery disease with PCI (with stent) in 2001.     Patient denies any chest discomfort on exertion or at rest.  Patient denies any dyspnea at rest or on exertion, orthopnea, PND, or edema.  Patient denies any palpitations, lightheadedness, or syncope. Patient denies lower extremity claudication. Still cutting grass. BP at home 140/90.      Past Medical History:   Diagnosis Date    Hypertension     S/P PTCA (percutaneous transluminal coronary angioplasty) 2001       Outpatient Medications Prior to Visit   Medication Sig Dispense Refill    amLODIPine (NORVASC) 10 MG tablet TAKE 1 TABLET BY MOUTH  DAILY 90 tablet 3    aspirin 325 MG tablet Take 500 mg by mouth once daily.      atenolol (TENORMIN) 50 MG tablet TAKE 1 TABLET BY MOUTH  DAILY 90 tablet 3    clopidogrel (PLAVIX) 75 mg tablet TAKE 1 TABLET BY MOUTH  DAILY 90 tablet 3    coenzyme Q10 400 mg Cap Take by mouth. 1 Capsule Oral Every day      FLAXSEED OIL MISC Take by mouth. 1 Capsule Oral Every day      furosemide (LASIX) 40 MG tablet TAKE 1 TABLET BY MOUTH ONCE A DAY 90 tablet 3    omega-3 fatty acids 1,250 mg Cap Take by mouth. 1 Capsule Oral Every day      rosuvastatin (CRESTOR) 40 MG Tab TAKE 1 TABLET BY MOUTH  DAILY 90 tablet 3    losartan (COZAAR) 100 MG tablet TAKE 1 TABLET BY MOUTH  DAILY 90 tablet 3     No facility-administered medications prior to visit.        ROS - No change from prior visit in neurologic, respiratory, endocrine, GI, hematologic, or constitutional complaints, other than loss 8 pounds because not eating as often.  Objective:   Physical Exam   Constitutional: He is oriented to person, place, and time. He appears well-developed and well-nourished.   BP  "(!) 180/86 (BP Location: Left arm, Patient Position: Sitting, BP Method: Large (Automatic))   Pulse (!) 40   Ht 5' 10.5" (1.791 m)   Wt 86.9 kg (191 lb 9.3 oz)   SpO2 97%   BMI 27.10 kg/m²    Eyes: Pupils are equal, round, and reactive to light.   Neck: Neck supple. No JVD present. Carotid bruit is not present. No thyromegaly present.   Cardiovascular: Normal rate, regular rhythm and intact distal pulses.  No extrasystoles are present. PMI is not displaced. Exam reveals gallop and S4. Exam reveals no friction rub.   No murmur heard.  Pulmonary/Chest: Effort normal and breath sounds normal. He has no wheezes. He has no rales.   Abdominal: Soft. Bowel sounds are normal. There is no hepatosplenomegaly. There is no tenderness.   Musculoskeletal: He exhibits no edema.   Neurological: He is alert and oriented to person, place, and time. He has normal strength.   Skin: No cyanosis. Nails show no clubbing.         Lab Results   Component Value Date     04/09/2018    K 5.1 04/09/2018    BUN 17 04/09/2018    CREATININE 1.7 (H) 04/09/2018    GLU 97 04/09/2018    CHOL 166 04/09/2018    HDL 48 04/09/2018    LDLCALC 93.6 04/09/2018    TRIG 122 04/09/2018    CHOLHDL 28.9 04/09/2018       Assessment:     1. Status post coronary artery stent placement    2. Coronary artery disease involving native coronary artery of native heart without angina pectoris    3. Hyperlipidemia, unspecified hyperlipidemia type    4. Essential hypertension      Patient has no symptoms of cardiac ischemia, heart failure, or significant arrhythmias. BP  Needs better control as it is not even controlled as an outpatient.  Will switch him to olmesartan to see if better BP control can be achieved.  If there are no intervening problems, patient should return in 1 year for reevaluation.  Plan:     Efra was seen today for status post coronary artery stent placement and medication management.    Diagnoses and all orders for this visit:    Status post " coronary artery stent placement    Coronary artery disease involving native coronary artery of native heart without angina pectoris    Hyperlipidemia, unspecified hyperlipidemia type    Essential hypertension    Other orders  -     olmesartan (BENICAR) 40 MG tablet; Take 1 tablet (40 mg total) by mouth once daily.          aHrsh Zamora MD  Consultative Cardiology

## 2018-10-05 ENCOUNTER — SEE NOTE (OUTPATIENT)
Dept: URBAN - METROPOLITAN AREA CLINIC 36 | Facility: CLINIC | Age: 73
Setting detail: DERMATOLOGY
End: 2018-10-05

## 2018-10-05 PROCEDURE — 99202 OFFICE O/P NEW SF 15 MIN: CPT

## 2019-01-07 ENCOUNTER — TELEPHONE (OUTPATIENT)
Dept: CARDIOLOGY | Facility: CLINIC | Age: 74
End: 2019-01-07

## 2019-01-07 NOTE — TELEPHONE ENCOUNTER
----- Message from Urszula Regalado sent at 1/7/2019 11:31 AM CST -----  Contact: pt  Pls call pt at [phone].  He saw on the TV that taking  amLODIPine (NORVASC) 10 MG tablet  Causes Cancer and wants to be prescribed another medication.    Dr. Sera KENNEDY 8/20/18

## 2019-01-07 NOTE — TELEPHONE ENCOUNTER
,          LOV:8/20/18.The pt said that he was in [place] and saw something on the news about amlodipine causing cancer.He wanted to see if you thought that he should be taking an alternative.I did tell him that over the past few months there has been recalls that evolved Valsartan and losartan but it was only related to certain manufacturers. Assured him that I would check with you and let him know.Please advise.Thanks,Yoselin

## 2019-01-08 RX ORDER — ROSUVASTATIN CALCIUM 40 MG/1
40 TABLET, COATED ORAL DAILY
Qty: 90 TABLET | Refills: 2 | Status: SHIPPED | OUTPATIENT
Start: 2019-01-08 | End: 2019-11-26 | Stop reason: SDUPTHER

## 2019-01-08 RX ORDER — ATENOLOL 50 MG/1
50 TABLET ORAL DAILY
Qty: 90 TABLET | Refills: 2 | Status: SHIPPED | OUTPATIENT
Start: 2019-01-08 | End: 2019-11-26 | Stop reason: SDUPTHER

## 2019-01-08 RX ORDER — FUROSEMIDE 40 MG/1
40 TABLET ORAL DAILY
Qty: 90 TABLET | Refills: 2 | Status: SHIPPED | OUTPATIENT
Start: 2019-01-08 | End: 2019-11-26 | Stop reason: SDUPTHER

## 2019-01-08 RX ORDER — OLMESARTAN MEDOXOMIL 40 MG/1
40 TABLET ORAL DAILY
Qty: 90 TABLET | Refills: 3 | Status: SHIPPED | OUTPATIENT
Start: 2019-01-08 | End: 2020-01-13 | Stop reason: SDUPTHER

## 2019-01-08 RX ORDER — CLOPIDOGREL BISULFATE 75 MG/1
75 TABLET ORAL DAILY
Qty: 90 TABLET | Refills: 2 | Status: SHIPPED | OUTPATIENT
Start: 2019-01-08 | End: 2019-11-26 | Stop reason: SDUPTHER

## 2019-01-08 RX ORDER — AMLODIPINE BESYLATE 10 MG/1
10 TABLET ORAL DAILY
Qty: 90 TABLET | Refills: 2 | Status: SHIPPED | OUTPATIENT
Start: 2019-01-08 | End: 2019-11-26 | Stop reason: SDUPTHER

## 2019-01-08 NOTE — TELEPHONE ENCOUNTER
Pt instructed as ordered by  and he verbalizes understanding and states he will continue to take the amlodipine.

## 2019-01-08 NOTE — TELEPHONE ENCOUNTER
----- Message from Francesca Malone sent at 1/8/2019  8:35 AM CST -----  Contact: Pt called   Pt need a refill on medications atenolol (TENORMIN) 50 MG tablet,amLODIPine (NORVASC) 10 MG tablet,clopidogrel (PLAVIX) 75 mg tablet,lostoran ,furosemide (LASIX) 40 MG tabletrosuvastatin (CRESTOR) 40 MG Tab,olmesartan (BENICAR) 40 MG tablet, and send to [facility] - [address] [phone] (Phone)[phone] (Fax). Last visit 8/20/18 Dr. Zamora. The pt can be reach @ [phone] if needed. Thank you.

## 2019-11-26 RX ORDER — AMLODIPINE BESYLATE 10 MG/1
TABLET ORAL
Qty: 90 TABLET | Refills: 2 | Status: SHIPPED | OUTPATIENT
Start: 2019-11-26 | End: 2020-07-12

## 2019-11-26 RX ORDER — CLOPIDOGREL BISULFATE 75 MG/1
TABLET ORAL
Qty: 90 TABLET | Refills: 2 | Status: SHIPPED | OUTPATIENT
Start: 2019-11-26 | End: 2020-07-12

## 2019-11-26 RX ORDER — FUROSEMIDE 40 MG/1
TABLET ORAL
Qty: 90 TABLET | Refills: 2 | Status: SHIPPED | OUTPATIENT
Start: 2019-11-26 | End: 2020-07-12

## 2019-11-26 RX ORDER — ROSUVASTATIN CALCIUM 40 MG/1
TABLET, COATED ORAL
Qty: 90 TABLET | Refills: 2 | Status: SHIPPED | OUTPATIENT
Start: 2019-11-26 | End: 2020-07-12

## 2019-11-26 RX ORDER — ATENOLOL 50 MG/1
TABLET ORAL
Qty: 90 TABLET | Refills: 2 | Status: SHIPPED | OUTPATIENT
Start: 2019-11-26 | End: 2020-07-12

## 2020-01-13 ENCOUNTER — OFFICE VISIT (OUTPATIENT)
Dept: CARDIOLOGY | Facility: CLINIC | Age: 75
End: 2020-01-13
Payer: MEDICARE

## 2020-01-13 VITALS
WEIGHT: 192.88 LBS | DIASTOLIC BLOOD PRESSURE: 102 MMHG | SYSTOLIC BLOOD PRESSURE: 222 MMHG | BODY MASS INDEX: 27.61 KG/M2 | HEART RATE: 44 BPM | HEIGHT: 70 IN

## 2020-01-13 DIAGNOSIS — I25.10 CORONARY ARTERY DISEASE INVOLVING NATIVE CORONARY ARTERY OF NATIVE HEART WITHOUT ANGINA PECTORIS: ICD-10-CM

## 2020-01-13 DIAGNOSIS — I49.9 CARDIAC ARRHYTHMIA, UNSPECIFIED CARDIAC ARRHYTHMIA TYPE: ICD-10-CM

## 2020-01-13 DIAGNOSIS — I10 HYPERTENSION, UNSPECIFIED TYPE: ICD-10-CM

## 2020-01-13 DIAGNOSIS — E78.5 HYPERLIPIDEMIA, UNSPECIFIED HYPERLIPIDEMIA TYPE: ICD-10-CM

## 2020-01-13 DIAGNOSIS — Z95.5 STATUS POST CORONARY ARTERY STENT PLACEMENT: Primary | ICD-10-CM

## 2020-01-13 PROCEDURE — 1159F MED LIST DOCD IN RCRD: CPT | Mod: ,,, | Performed by: INTERNAL MEDICINE

## 2020-01-13 PROCEDURE — 93005 ELECTROCARDIOGRAM TRACING: CPT | Mod: PBBFAC | Performed by: INTERNAL MEDICINE

## 2020-01-13 PROCEDURE — 1126F PR PAIN SEVERITY QUANTIFIED, NO PAIN PRESENT: ICD-10-PCS | Mod: ,,, | Performed by: INTERNAL MEDICINE

## 2020-01-13 PROCEDURE — 1159F PR MEDICATION LIST DOCUMENTED IN MEDICAL RECORD: ICD-10-PCS | Mod: ,,, | Performed by: INTERNAL MEDICINE

## 2020-01-13 PROCEDURE — 93010 ELECTROCARDIOGRAM REPORT: CPT | Mod: S$PBB,,, | Performed by: INTERNAL MEDICINE

## 2020-01-13 PROCEDURE — 1126F AMNT PAIN NOTED NONE PRSNT: CPT | Mod: ,,, | Performed by: INTERNAL MEDICINE

## 2020-01-13 PROCEDURE — 99214 OFFICE O/P EST MOD 30 MIN: CPT | Mod: S$PBB,,, | Performed by: INTERNAL MEDICINE

## 2020-01-13 PROCEDURE — 99214 PR OFFICE/OUTPT VISIT, EST, LEVL IV, 30-39 MIN: ICD-10-PCS | Mod: S$PBB,,, | Performed by: INTERNAL MEDICINE

## 2020-01-13 PROCEDURE — 93010 EKG 12-LEAD: ICD-10-PCS | Mod: S$PBB,,, | Performed by: INTERNAL MEDICINE

## 2020-01-13 PROCEDURE — 99999 PR PBB SHADOW E&M-EST. PATIENT-LVL III: ICD-10-PCS | Mod: PBBFAC,,, | Performed by: INTERNAL MEDICINE

## 2020-01-13 PROCEDURE — 99213 OFFICE O/P EST LOW 20 MIN: CPT | Mod: PBBFAC | Performed by: INTERNAL MEDICINE

## 2020-01-13 PROCEDURE — 99999 PR PBB SHADOW E&M-EST. PATIENT-LVL III: CPT | Mod: PBBFAC,,, | Performed by: INTERNAL MEDICINE

## 2020-01-13 RX ORDER — OLMESARTAN MEDOXOMIL 40 MG/1
40 TABLET ORAL DAILY
Qty: 90 TABLET | Refills: 3 | Status: SHIPPED | OUTPATIENT
Start: 2020-01-13 | End: 2020-11-30

## 2020-01-13 NOTE — PROGRESS NOTES
"Chart has been dictated using voice recognition software.  It is not been reviewed carefully for any transcriptional errors due to this technology.   Subjective:   Patient ID:  Efra Huitron is a 74 y.o. male who presents for follow-up of Status post coronary artery stent placement and Medication Management (benicar )      HPI: Patient with primary home in [place] with hypertension, hyperlipidemia, and coronary artery disease with PCI (with stent) in 2001.     Patient denies any chest discomfort on exertion or at rest.  Patient denies any dyspnea at rest or on exertion, orthopnea, or PND. Has occasional edema. Patient denies any palpitations, lightheadedness, or syncope. Notes that when he takes all of his BP meds together he "feels like a Zombie ".  Takes Vit B12 and Protegra to counter this feeling.    Past Medical History:   Diagnosis Date    Hypertension     S/P PTCA (percutaneous transluminal coronary angioplasty) 2001       Outpatient Medications Prior to Visit   Medication Sig Dispense Refill    amLODIPine (NORVASC) 10 MG tablet TAKE 1 TABLET BY MOUTH ONCE DAILY 90 tablet 2    aspirin 325 MG tablet Take 325 mg by mouth once daily.       atenolol (TENORMIN) 50 MG tablet TAKE 1 TABLET BY MOUTH ONCE DAILY 90 tablet 2    clopidogrel (PLAVIX) 75 mg tablet TAKE 1 TABLET BY MOUTH ONCE DAILY 90 tablet 2    coenzyme Q10 400 mg Cap Take by mouth. 1 Capsule Oral Every day      FLAXSEED OIL MISC Take by mouth. 1 Capsule Oral Every day      furosemide (LASIX) 40 MG tablet TAKE 1 TABLET BY MOUTH ONCE DAILY 90 tablet 2    omega-3 fatty acids 1,250 mg Cap Take by mouth. 1 Capsule Oral Every day      rosuvastatin (CRESTOR) 40 MG Tab TAKE 1 TABLET BY MOUTH ONCE DAILY 90 tablet 2    olmesartan (BENICAR) 40 MG tablet Take 1 tablet (40 mg total) by mouth once daily. 90 tablet 3     No facility-administered medications prior to visit.        Review of Systems   Constitution: Positive for weight loss (7- 8 pounds " "over a year based on changes in diet). Negative for weight gain.   HENT: Negative for nosebleeds.    Cardiovascular: Negative for claudication.        As above   Respiratory: Negative for hemoptysis, shortness of breath, snoring, sputum production and wheezing.    Endocrine: Negative for polydipsia and polyuria.   Hematologic/Lymphatic: Does not bruise/bleed easily.   Musculoskeletal: Negative for myalgias.   Gastrointestinal: Negative for change in bowel habit, hematemesis, hematochezia, melena, nausea and vomiting.   Genitourinary: Negative for hematuria.   Neurological: Negative for focal weakness and numbness.      Objective:   Physical Exam   Constitutional: He is oriented to person, place, and time. He appears well-developed and well-nourished.   BP (!) 222/102 (BP Location: Left arm, Patient Position: Sitting, BP Method: Medium (Automatic))   Pulse (!) 44   Ht 5' 10" (1.778 m)   Wt 87.5 kg (192 lb 14.4 oz)   BMI 27.68 kg/m²    Eyes: Pupils are equal, round, and reactive to light.   Neck: Neck supple. No JVD present. Carotid bruit is not present. No thyromegaly present.   Cardiovascular: Regular rhythm and intact distal pulses. Frequent extrasystoles are present. Bradycardia present. PMI is not displaced. Exam reveals gallop and S4. Exam reveals no friction rub.   No murmur heard.  Pulmonary/Chest: Effort normal and breath sounds normal. He has no wheezes. He has no rales.   Abdominal: Soft. Bowel sounds are normal. There is no hepatosplenomegaly. There is no tenderness.   Musculoskeletal: He exhibits edema (2+ bilateral ankle edema).   Neurological: He is alert and oriented to person, place, and time. He has normal strength.   Skin: No cyanosis. Nails show no clubbing.         Lab Results   Component Value Date     04/09/2018    K 5.1 04/09/2018    BUN 17 04/09/2018    CREATININE 1.7 (H) 04/09/2018    GLU 97 04/09/2018    CHOL 166 04/09/2018    HDL 48 04/09/2018    LDLCALC 93.6 04/09/2018    TRIG " 122 04/09/2018    CHOLHDL 28.9 04/09/2018     ECG shows sinus bradycardia at a rate in the 40s with frequent PACs with aberrant conduction.  Is nonspecific ST T wave changes and possible left ventricular hypertrophy with associated changes.  Assessment:     1. Status post coronary artery stent placement    2. Cardiac arrhythmia, unspecified cardiac arrhythmia type    3. Coronary artery disease involving native coronary artery of native heart without angina pectoris    4. Hyperlipidemia, unspecified hyperlipidemia type    5. Hypertension, unspecified type      Patient has no symptoms of cardiac ischemia, heart failure, or significant arrhythmias.  However, the patient has a significantly elevated, asymptomatic, hypertension today it should not be driving back to [place].  The patient was advised to stop taking vitamin B12 and Protegra as he notices that these elevate his blood pressure.  The patient will have atenolol and almost start in moved to the nighttime so that he is taking furosemide in amlodipine in the morning and atenolol on olmesartan at nighttime to see if this can better balance his blood pressure fluctuations and keep him from feeling like a zombie.  The patient should return in 2 days to have his blood pressure checked before driving to [place].  Hopefully, at that time, his blood pressure will be significantly lower than it is today.  Further decisions to be made upon seeing the results of his blood pressure in 2 days.  Plan:     Efra was seen today for status post coronary artery stent placement and medication management.    Diagnoses and all orders for this visit:    Status post coronary artery stent placement    Cardiac arrhythmia, unspecified cardiac arrhythmia type  -     EKG 12-lead; Future    Coronary artery disease involving native coronary artery of native heart without angina pectoris    Hyperlipidemia, unspecified hyperlipidemia type    Hypertension, unspecified type    Other orders  -      olmesartan (BENICAR) 40 MG tablet; Take 1 tablet (40 mg total) by mouth once daily.          Harsh Zamora MD  Consultative Cardiology

## 2020-01-13 NOTE — PATIENT INSTRUCTIONS
For your blood pressure medicines  Take the following medications in the morning:  · aspirin 325 MG tablet,   · furosemide (LASIX) 40 MG tablet,   · amlodipine (NORVASC) 10 MG tablet    Take the following medications in the evening:  · atenolol (TENORMIN) 50 MG tablet,   · olmesartan (BENICAR) 40 MG tablet

## 2020-01-15 ENCOUNTER — TELEPHONE (OUTPATIENT)
Dept: CARDIOLOGY | Facility: CLINIC | Age: 75
End: 2020-01-15

## 2020-01-15 DIAGNOSIS — I10 HYPERTENSION, UNSPECIFIED TYPE: Primary | ICD-10-CM

## 2021-02-12 ENCOUNTER — NURSE TRIAGE (OUTPATIENT)
Dept: ADMINISTRATIVE | Facility: CLINIC | Age: 76
End: 2021-02-12

## 2021-02-17 RX ORDER — CLOPIDOGREL BISULFATE 75 MG/1
TABLET ORAL
Qty: 90 TABLET | Refills: 3 | Status: SHIPPED | OUTPATIENT
Start: 2021-02-17 | End: 2021-11-23

## 2021-02-17 RX ORDER — FUROSEMIDE 40 MG/1
TABLET ORAL
Qty: 90 TABLET | Refills: 3 | Status: SHIPPED | OUTPATIENT
Start: 2021-02-17 | End: 2021-11-23

## 2021-02-17 RX ORDER — ROSUVASTATIN CALCIUM 40 MG/1
TABLET, COATED ORAL
Qty: 90 TABLET | Refills: 3 | Status: SHIPPED | OUTPATIENT
Start: 2021-02-17 | End: 2021-11-23

## 2021-02-17 RX ORDER — ATENOLOL 50 MG/1
TABLET ORAL
Qty: 90 TABLET | Refills: 3 | Status: SHIPPED | OUTPATIENT
Start: 2021-02-17 | End: 2021-03-12

## 2021-02-17 RX ORDER — AMLODIPINE BESYLATE 10 MG/1
TABLET ORAL
Qty: 90 TABLET | Refills: 3 | Status: SHIPPED | OUTPATIENT
Start: 2021-02-17 | End: 2021-11-23

## 2021-02-24 ENCOUNTER — IMMUNIZATION (OUTPATIENT)
Dept: PHARMACY | Facility: CLINIC | Age: 76
End: 2021-02-24
Payer: MEDICARE

## 2021-02-24 DIAGNOSIS — Z23 NEED FOR VACCINATION: Primary | ICD-10-CM

## 2021-03-12 ENCOUNTER — OFFICE VISIT (OUTPATIENT)
Dept: INTERNAL MEDICINE | Facility: CLINIC | Age: 76
End: 2021-03-12
Payer: MEDICARE

## 2021-03-12 ENCOUNTER — LAB VISIT (OUTPATIENT)
Dept: LAB | Facility: HOSPITAL | Age: 76
End: 2021-03-12
Payer: MEDICARE

## 2021-03-12 ENCOUNTER — TELEPHONE (OUTPATIENT)
Dept: INTERNAL MEDICINE | Facility: CLINIC | Age: 76
End: 2021-03-12

## 2021-03-12 VITALS
HEIGHT: 70 IN | BODY MASS INDEX: 28.06 KG/M2 | SYSTOLIC BLOOD PRESSURE: 182 MMHG | DIASTOLIC BLOOD PRESSURE: 96 MMHG | WEIGHT: 196 LBS | HEART RATE: 48 BPM

## 2021-03-12 DIAGNOSIS — N18.31 STAGE 3A CHRONIC KIDNEY DISEASE: ICD-10-CM

## 2021-03-12 DIAGNOSIS — Z00.00 ANNUAL PHYSICAL EXAM: Primary | ICD-10-CM

## 2021-03-12 DIAGNOSIS — I25.10 CORONARY ARTERY DISEASE INVOLVING NATIVE CORONARY ARTERY OF NATIVE HEART WITHOUT ANGINA PECTORIS: ICD-10-CM

## 2021-03-12 DIAGNOSIS — Z95.5 STATUS POST CORONARY ARTERY STENT PLACEMENT: ICD-10-CM

## 2021-03-12 DIAGNOSIS — I10 ESSENTIAL HYPERTENSION: ICD-10-CM

## 2021-03-12 DIAGNOSIS — I21.9 ACUTE MYOCARDIAL INFARCTION, UNSPECIFIED: ICD-10-CM

## 2021-03-12 DIAGNOSIS — E78.2 MIXED HYPERLIPIDEMIA: ICD-10-CM

## 2021-03-12 DIAGNOSIS — Z00.00 ANNUAL PHYSICAL EXAM: ICD-10-CM

## 2021-03-12 LAB
25(OH)D3+25(OH)D2 SERPL-MCNC: 44 NG/ML (ref 30–96)
ALBUMIN SERPL BCP-MCNC: 3.6 G/DL (ref 3.5–5.2)
ALP SERPL-CCNC: 211 U/L (ref 55–135)
ALT SERPL W/O P-5'-P-CCNC: 69 U/L (ref 10–44)
ANION GAP SERPL CALC-SCNC: 5 MMOL/L (ref 8–16)
AST SERPL-CCNC: 68 U/L (ref 10–40)
BASOPHILS # BLD AUTO: 0.04 K/UL (ref 0–0.2)
BASOPHILS NFR BLD: 1.2 % (ref 0–1.9)
BILIRUB SERPL-MCNC: 1 MG/DL (ref 0.1–1)
BUN SERPL-MCNC: 15 MG/DL (ref 8–23)
CALCIUM SERPL-MCNC: 9.2 MG/DL (ref 8.7–10.5)
CHLORIDE SERPL-SCNC: 106 MMOL/L (ref 95–110)
CHOLEST SERPL-MCNC: 143 MG/DL (ref 120–199)
CHOLEST/HDLC SERPL: 3 {RATIO} (ref 2–5)
CO2 SERPL-SCNC: 30 MMOL/L (ref 23–29)
CREAT SERPL-MCNC: 1.9 MG/DL (ref 0.5–1.4)
CREAT UR-MCNC: 144 MG/DL (ref 23–375)
DIFFERENTIAL METHOD: ABNORMAL
EOSINOPHIL # BLD AUTO: 0.1 K/UL (ref 0–0.5)
EOSINOPHIL NFR BLD: 2.6 % (ref 0–8)
ERYTHROCYTE [DISTWIDTH] IN BLOOD BY AUTOMATED COUNT: 14.1 % (ref 11.5–14.5)
EST. GFR  (AFRICAN AMERICAN): 39 ML/MIN/1.73 M^2
EST. GFR  (NON AFRICAN AMERICAN): 33.7 ML/MIN/1.73 M^2
ESTIMATED AVG GLUCOSE: 108 MG/DL (ref 68–131)
GLUCOSE SERPL-MCNC: 89 MG/DL (ref 70–110)
HBA1C MFR BLD: 5.4 % (ref 4–5.6)
HCT VFR BLD AUTO: 50.6 % (ref 40–54)
HCV AB SERPL QL IA: NEGATIVE
HDLC SERPL-MCNC: 48 MG/DL (ref 40–75)
HDLC SERPL: 33.6 % (ref 20–50)
HGB BLD-MCNC: 16.1 G/DL (ref 14–18)
IMM GRANULOCYTES # BLD AUTO: 0 K/UL (ref 0–0.04)
IMM GRANULOCYTES NFR BLD AUTO: 0 % (ref 0–0.5)
LDLC SERPL CALC-MCNC: 77.8 MG/DL (ref 63–159)
LYMPHOCYTES # BLD AUTO: 0.8 K/UL (ref 1–4.8)
LYMPHOCYTES NFR BLD: 22.6 % (ref 18–48)
MCH RBC QN AUTO: 28.5 PG (ref 27–31)
MCHC RBC AUTO-ENTMCNC: 31.8 G/DL (ref 32–36)
MCV RBC AUTO: 90 FL (ref 82–98)
MONOCYTES # BLD AUTO: 0.5 K/UL (ref 0.3–1)
MONOCYTES NFR BLD: 13.2 % (ref 4–15)
NEUTROPHILS # BLD AUTO: 2.1 K/UL (ref 1.8–7.7)
NEUTROPHILS NFR BLD: 60.4 % (ref 38–73)
NONHDLC SERPL-MCNC: 95 MG/DL
NRBC BLD-RTO: 0 /100 WBC
PLATELET # BLD AUTO: 167 K/UL (ref 150–350)
PMV BLD AUTO: 10.9 FL (ref 9.2–12.9)
POTASSIUM SERPL-SCNC: 5.1 MMOL/L (ref 3.5–5.1)
PROT SERPL-MCNC: 7 G/DL (ref 6–8.4)
PROT UR-MCNC: 169 MG/DL (ref 0–15)
PROT/CREAT UR: 1.17 MG/G{CREAT} (ref 0–0.2)
PTH-INTACT SERPL-MCNC: 208 PG/ML (ref 9–77)
RBC # BLD AUTO: 5.64 M/UL (ref 4.6–6.2)
SODIUM SERPL-SCNC: 141 MMOL/L (ref 136–145)
TRIGL SERPL-MCNC: 86 MG/DL (ref 30–150)
WBC # BLD AUTO: 3.41 K/UL (ref 3.9–12.7)

## 2021-03-12 PROCEDURE — 99204 OFFICE O/P NEW MOD 45 MIN: CPT | Mod: S$PBB,GC,, | Performed by: STUDENT IN AN ORGANIZED HEALTH CARE EDUCATION/TRAINING PROGRAM

## 2021-03-12 PROCEDURE — 82306 VITAMIN D 25 HYDROXY: CPT | Performed by: STUDENT IN AN ORGANIZED HEALTH CARE EDUCATION/TRAINING PROGRAM

## 2021-03-12 PROCEDURE — 99213 OFFICE O/P EST LOW 20 MIN: CPT | Mod: PBBFAC | Performed by: STUDENT IN AN ORGANIZED HEALTH CARE EDUCATION/TRAINING PROGRAM

## 2021-03-12 PROCEDURE — 99999 PR PBB SHADOW E&M-EST. PATIENT-LVL III: CPT | Mod: PBBFAC,GC,, | Performed by: STUDENT IN AN ORGANIZED HEALTH CARE EDUCATION/TRAINING PROGRAM

## 2021-03-12 PROCEDURE — 86803 HEPATITIS C AB TEST: CPT | Performed by: STUDENT IN AN ORGANIZED HEALTH CARE EDUCATION/TRAINING PROGRAM

## 2021-03-12 PROCEDURE — 80061 LIPID PANEL: CPT | Performed by: STUDENT IN AN ORGANIZED HEALTH CARE EDUCATION/TRAINING PROGRAM

## 2021-03-12 PROCEDURE — 80053 COMPREHEN METABOLIC PANEL: CPT | Performed by: STUDENT IN AN ORGANIZED HEALTH CARE EDUCATION/TRAINING PROGRAM

## 2021-03-12 PROCEDURE — 99204 PR OFFICE/OUTPT VISIT, NEW, LEVL IV, 45-59 MIN: ICD-10-PCS | Mod: S$PBB,GC,, | Performed by: STUDENT IN AN ORGANIZED HEALTH CARE EDUCATION/TRAINING PROGRAM

## 2021-03-12 PROCEDURE — 85025 COMPLETE CBC W/AUTO DIFF WBC: CPT | Performed by: STUDENT IN AN ORGANIZED HEALTH CARE EDUCATION/TRAINING PROGRAM

## 2021-03-12 PROCEDURE — 83036 HEMOGLOBIN GLYCOSYLATED A1C: CPT | Performed by: STUDENT IN AN ORGANIZED HEALTH CARE EDUCATION/TRAINING PROGRAM

## 2021-03-12 PROCEDURE — 99999 PR PBB SHADOW E&M-EST. PATIENT-LVL III: ICD-10-PCS | Mod: PBBFAC,GC,, | Performed by: STUDENT IN AN ORGANIZED HEALTH CARE EDUCATION/TRAINING PROGRAM

## 2021-03-12 PROCEDURE — 36415 COLL VENOUS BLD VENIPUNCTURE: CPT | Performed by: STUDENT IN AN ORGANIZED HEALTH CARE EDUCATION/TRAINING PROGRAM

## 2021-03-12 PROCEDURE — 84156 ASSAY OF PROTEIN URINE: CPT | Performed by: STUDENT IN AN ORGANIZED HEALTH CARE EDUCATION/TRAINING PROGRAM

## 2021-03-12 PROCEDURE — 83970 ASSAY OF PARATHORMONE: CPT | Performed by: STUDENT IN AN ORGANIZED HEALTH CARE EDUCATION/TRAINING PROGRAM

## 2021-03-12 RX ORDER — LANOLIN ALCOHOL/MO/W.PET/CERES
400 CREAM (GRAM) TOPICAL DAILY
COMMUNITY

## 2021-03-12 RX ORDER — WITCH HAZEL 50 %
2000 PADS, MEDICATED (EA) TOPICAL DAILY
COMMUNITY

## 2021-03-12 RX ORDER — CARVEDILOL 25 MG/1
12.5 TABLET ORAL 2 TIMES DAILY WITH MEALS
Qty: 90 TABLET | Refills: 3 | Status: SHIPPED | OUTPATIENT
Start: 2021-03-12 | End: 2022-03-12

## 2021-03-12 RX ORDER — MULTIVIT WITH MINERALS/HERBS
1 TABLET ORAL DAILY
COMMUNITY
End: 2021-03-12 | Stop reason: CLARIF

## 2021-03-14 PROBLEM — N18.31 STAGE 3A CHRONIC KIDNEY DISEASE: Status: ACTIVE | Noted: 2021-03-14

## 2021-03-16 ENCOUNTER — TELEPHONE (OUTPATIENT)
Dept: HEPATOLOGY | Facility: HOSPITAL | Age: 76
End: 2021-03-16

## 2021-03-16 DIAGNOSIS — R74.01 ELEVATED TRANSAMINASE LEVEL: Primary | ICD-10-CM

## 2021-03-24 ENCOUNTER — IMMUNIZATION (OUTPATIENT)
Dept: PHARMACY | Facility: CLINIC | Age: 76
End: 2021-03-24
Payer: MEDICARE

## 2021-03-24 DIAGNOSIS — Z23 NEED FOR VACCINATION: Primary | ICD-10-CM

## 2021-10-05 ENCOUNTER — TELEPHONE (OUTPATIENT)
Dept: CARDIOLOGY | Facility: HOSPITAL | Age: 76
End: 2021-10-05

## 2021-10-05 ENCOUNTER — TELEPHONE (OUTPATIENT)
Dept: INTERNAL MEDICINE | Facility: CLINIC | Age: 76
End: 2021-10-05

## 2021-11-05 ENCOUNTER — IMMUNIZATION (OUTPATIENT)
Dept: PHARMACY | Facility: CLINIC | Age: 76
End: 2021-11-05
Payer: MEDICARE

## 2021-11-05 DIAGNOSIS — Z23 NEED FOR VACCINATION: Primary | ICD-10-CM

## 2022-10-07 RX ORDER — FUROSEMIDE 40 MG/1
TABLET ORAL
Qty: 90 TABLET | Refills: 3 | OUTPATIENT
Start: 2022-10-07

## 2022-10-07 NOTE — TELEPHONE ENCOUNTER
Per dr Cerna's note, [place] out of [place]. I left voicemail to pt and wife concerning calling me about the refill. No cardiology visit in 5 years.

## 2024-01-18 ENCOUNTER — TELEPHONE (OUTPATIENT)
Dept: CARDIOLOGY | Facility: CLINIC | Age: 79
End: 2024-01-18
Payer: MEDICARE

## 2024-01-18 NOTE — TELEPHONE ENCOUNTER
IC staff attempted to contact patient to inform him that due to the length of time that his procedure took place over 20 years ago, that he would have to contact medical records at [phone] to obtain that information. Unfortunately the chart does not go back that far to assess that information.  VM message left on patient's phone.